# Patient Record
Sex: MALE | Race: WHITE | NOT HISPANIC OR LATINO | Employment: OTHER | URBAN - METROPOLITAN AREA
[De-identification: names, ages, dates, MRNs, and addresses within clinical notes are randomized per-mention and may not be internally consistent; named-entity substitution may affect disease eponyms.]

---

## 2023-08-09 RX ORDER — ROSUVASTATIN CALCIUM 20 MG/1
20 TABLET, COATED ORAL NIGHTLY
COMMUNITY
Start: 2023-04-21 | End: 2023-08-09 | Stop reason: SDUPTHER

## 2023-08-09 RX ORDER — LISINOPRIL 10 MG/1
10 TABLET ORAL
COMMUNITY
Start: 2023-04-21 | End: 2023-08-09 | Stop reason: SDUPTHER

## 2023-08-09 RX ORDER — METFORMIN HYDROCHLORIDE 500 MG/1
1000 TABLET ORAL 2 TIMES DAILY
COMMUNITY
Start: 2023-04-21 | End: 2023-08-09 | Stop reason: SDUPTHER

## 2023-08-09 RX ORDER — LEVOTHYROXINE SODIUM 137 UG/1
137 TABLET ORAL EVERY MORNING
COMMUNITY
Start: 2023-04-24 | End: 2023-08-09 | Stop reason: SDUPTHER

## 2023-08-09 NOTE — TELEPHONE ENCOUNTER
Pt states that he is soon to be out of all of his Rx.  Pt is scheduled with you in January and on wait list for earlier available appointment.

## 2023-08-10 RX ORDER — ROSUVASTATIN CALCIUM 20 MG/1
20 TABLET, COATED ORAL NIGHTLY
Qty: 90 TABLET | Refills: 1 | Status: SHIPPED | OUTPATIENT
Start: 2023-08-10 | End: 2024-02-20

## 2023-08-10 RX ORDER — LEVOTHYROXINE SODIUM 137 UG/1
137 TABLET ORAL EVERY MORNING
Qty: 90 TABLET | Refills: 1 | Status: SHIPPED | OUTPATIENT
Start: 2023-08-10 | End: 2024-02-20

## 2023-08-10 RX ORDER — LISINOPRIL 10 MG/1
10 TABLET ORAL DAILY
Qty: 90 TABLET | Refills: 1 | Status: SHIPPED | OUTPATIENT
Start: 2023-08-10 | End: 2024-02-20

## 2023-08-10 RX ORDER — METFORMIN HYDROCHLORIDE 500 MG/1
1000 TABLET ORAL 2 TIMES DAILY
Qty: 360 TABLET | Refills: 1 | Status: SHIPPED | OUTPATIENT
Start: 2023-08-10 | End: 2024-02-20

## 2023-12-12 ENCOUNTER — TELEPHONE (OUTPATIENT)
Dept: PRIMARY CARE CLINIC | Facility: CLINIC | Age: 73
End: 2023-12-12
Payer: MEDICARE

## 2023-12-12 ENCOUNTER — PATIENT MESSAGE (OUTPATIENT)
Dept: PRIMARY CARE CLINIC | Facility: CLINIC | Age: 73
End: 2023-12-12
Payer: MEDICARE

## 2023-12-12 DIAGNOSIS — E78.5 HYPERLIPIDEMIA, UNSPECIFIED HYPERLIPIDEMIA TYPE: ICD-10-CM

## 2023-12-12 DIAGNOSIS — E03.8 TSH (THYROID-STIMULATING HORMONE DEFICIENCY): ICD-10-CM

## 2023-12-12 DIAGNOSIS — I10 HYPERTENSION, UNSPECIFIED TYPE: Primary | ICD-10-CM

## 2023-12-12 DIAGNOSIS — R73.03 PRE-DIABETES: ICD-10-CM

## 2023-12-12 NOTE — TELEPHONE ENCOUNTER
----- Message from Chaya Farrell sent at 12/12/2023  8:01 AM CST -----  Name of Who is Calling:ROWAN RICARDO [[id number]]        What is the request in detail:Pt would like to request lab order to be sent over to [facility] prior to annual appt coming up, Pt states the request has been to [facility] before. Please advise thank you       Can the clinic reply by [facility]:NO        What Number to Call Back if not in [facility]:.Telephone Information:  Mobile          [phone]

## 2023-12-12 NOTE — TELEPHONE ENCOUNTER
----- Message from Chaya Farrell sent at 12/12/2023  8:01 AM CST -----  Name of Who is Calling:ROWAN RICARDO [[id number]]        What is the request in detail:Pt would like to request lab order to be sent over to [facility] prior to annual appt coming up, Pt states the request has been to [facility] before. Please advise thank you       Can the clinic reply by [facility]:NO        What Number to Call Back if not in [facility]:.Telephone Information:  Mobile          [phone]

## 2023-12-15 LAB
ALBUMIN SERPL-MCNC: 4.1 G/DL (ref 3.6–5.1)
ALBUMIN/GLOB SERPL: 1.5 (CALC) (ref 1–2.5)
ALP SERPL-CCNC: 64 U/L (ref 35–144)
ALT SERPL-CCNC: 11 U/L (ref 9–46)
AST SERPL-CCNC: 13 U/L (ref 10–35)
BASOPHILS # BLD AUTO: 51 CELLS/UL (ref 0–200)
BASOPHILS NFR BLD AUTO: 0.7 %
BILIRUB SERPL-MCNC: 0.4 MG/DL (ref 0.2–1.2)
BUN SERPL-MCNC: 18 MG/DL (ref 7–25)
BUN/CREAT SERPL: NORMAL (CALC) (ref 6–22)
CALCIUM SERPL-MCNC: 9.3 MG/DL (ref 8.6–10.3)
CHLORIDE SERPL-SCNC: 105 MMOL/L (ref 98–110)
CHOLEST SERPL-MCNC: 151 MG/DL
CHOLEST/HDLC SERPL: 2.8 (CALC)
CO2 SERPL-SCNC: 26 MMOL/L (ref 20–32)
CREAT SERPL-MCNC: 0.92 MG/DL (ref 0.7–1.28)
EGFR: 89 ML/MIN/1.73M2
EOSINOPHIL # BLD AUTO: 110 CELLS/UL (ref 15–500)
EOSINOPHIL NFR BLD AUTO: 1.5 %
ERYTHROCYTE [DISTWIDTH] IN BLOOD BY AUTOMATED COUNT: 12.4 % (ref 11–15)
GLOBULIN SER CALC-MCNC: 2.8 G/DL (CALC) (ref 1.9–3.7)
GLUCOSE SERPL-MCNC: 94 MG/DL (ref 65–99)
HBA1C MFR BLD: 5.7 % OF TOTAL HGB
HCT VFR BLD AUTO: 46.1 % (ref 38.5–50)
HDLC SERPL-MCNC: 54 MG/DL
HGB BLD-MCNC: 15.3 G/DL (ref 13.2–17.1)
LDLC SERPL CALC-MCNC: 77 MG/DL (CALC)
LYMPHOCYTES # BLD AUTO: 2862 CELLS/UL (ref 850–3900)
LYMPHOCYTES NFR BLD AUTO: 39.2 %
MCH RBC QN AUTO: 29.8 PG (ref 27–33)
MCHC RBC AUTO-ENTMCNC: 33.2 G/DL (ref 32–36)
MCV RBC AUTO: 89.9 FL (ref 80–100)
MONOCYTES # BLD AUTO: 767 CELLS/UL (ref 200–950)
MONOCYTES NFR BLD AUTO: 10.5 %
NEUTROPHILS # BLD AUTO: 3511 CELLS/UL (ref 1500–7800)
NEUTROPHILS NFR BLD AUTO: 48.1 %
NONHDLC SERPL-MCNC: 97 MG/DL (CALC)
PLATELET # BLD AUTO: 280 THOUSAND/UL (ref 140–400)
PMV BLD REES-ECKER: 10.4 FL (ref 7.5–12.5)
POTASSIUM SERPL-SCNC: 5.3 MMOL/L (ref 3.5–5.3)
PROT SERPL-MCNC: 6.9 G/DL (ref 6.1–8.1)
RBC # BLD AUTO: 5.13 MILLION/UL (ref 4.2–5.8)
SODIUM SERPL-SCNC: 141 MMOL/L (ref 135–146)
T3FREE SERPL-MCNC: 3.5 PG/ML (ref 2.3–4.2)
T4 FREE SERPL-MCNC: 1.7 NG/DL (ref 0.8–1.8)
TRIGL SERPL-MCNC: 114 MG/DL
TSH SERPL-ACNC: 1.49 MIU/L (ref 0.4–4.5)
WBC # BLD AUTO: 7.3 THOUSAND/UL (ref 3.8–10.8)

## 2024-01-02 ENCOUNTER — OFFICE VISIT (OUTPATIENT)
Dept: PRIMARY CARE CLINIC | Facility: CLINIC | Age: 74
End: 2024-01-02
Payer: MEDICARE

## 2024-01-02 VITALS
OXYGEN SATURATION: 97 % | RESPIRATION RATE: 18 BRPM | TEMPERATURE: 98 F | BODY MASS INDEX: 31.04 KG/M2 | WEIGHT: 204.81 LBS | HEART RATE: 62 BPM | HEIGHT: 68 IN | DIASTOLIC BLOOD PRESSURE: 62 MMHG | SYSTOLIC BLOOD PRESSURE: 118 MMHG

## 2024-01-02 DIAGNOSIS — I10 PRIMARY HYPERTENSION: ICD-10-CM

## 2024-01-02 DIAGNOSIS — R73.03 PRE-DIABETES: ICD-10-CM

## 2024-01-02 DIAGNOSIS — E89.0 POSTOPERATIVE HYPOTHYROIDISM: ICD-10-CM

## 2024-01-02 DIAGNOSIS — E78.5 HYPERLIPIDEMIA, UNSPECIFIED HYPERLIPIDEMIA TYPE: Primary | ICD-10-CM

## 2024-01-02 DIAGNOSIS — I10 HYPERTENSION, UNSPECIFIED TYPE: ICD-10-CM

## 2024-01-02 PROBLEM — E11.9 TYPE 2 DIABETES MELLITUS WITHOUT COMPLICATION, UNSPECIFIED WHETHER LONG TERM INSULIN USE: Status: ACTIVE | Noted: 2024-01-02

## 2024-01-02 PROBLEM — E11.9 TYPE 2 DIABETES MELLITUS WITHOUT COMPLICATION, UNSPECIFIED WHETHER LONG TERM INSULIN USE: Status: RESOLVED | Noted: 2024-01-02 | Resolved: 2024-01-02

## 2024-01-02 PROBLEM — E03.9 HYPOTHYROIDISM: Status: ACTIVE | Noted: 2024-01-02

## 2024-01-02 PROCEDURE — 3008F BODY MASS INDEX DOCD: CPT | Mod: CPTII,S$GLB,, | Performed by: FAMILY MEDICINE

## 2024-01-02 PROCEDURE — 99214 OFFICE O/P EST MOD 30 MIN: CPT | Mod: S$GLB,,, | Performed by: FAMILY MEDICINE

## 2024-01-02 PROCEDURE — 99999 PR PBB SHADOW E&M-EST. PATIENT-LVL III: CPT | Mod: PBBFAC,,, | Performed by: FAMILY MEDICINE

## 2024-01-02 PROCEDURE — 3074F SYST BP LT 130 MM HG: CPT | Mod: CPTII,S$GLB,, | Performed by: FAMILY MEDICINE

## 2024-01-02 PROCEDURE — 3078F DIAST BP <80 MM HG: CPT | Mod: CPTII,S$GLB,, | Performed by: FAMILY MEDICINE

## 2024-01-02 PROCEDURE — 1126F AMNT PAIN NOTED NONE PRSNT: CPT | Mod: CPTII,S$GLB,, | Performed by: FAMILY MEDICINE

## 2024-01-02 RX ORDER — AMOXICILLIN 500 MG
2 CAPSULE ORAL
COMMUNITY

## 2024-01-02 NOTE — PROGRESS NOTES
"    [facility]       [address] [place] [address]      [phone]     Marla Scott MD         .      Office Visit  01/02/2024  [id number]      SUBJECTIVE     HPI:  Delvis Russell is a 73 y.o. male presents today in clinic for "Follow-up  ."   Patient is here for routine follow-up.  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  Also has a history of cholesterol and hypertension on medications and well-controlled.  He is making plans to be more active this year and has been able to lose a few lb.  He has no complaints.  His sleep is good.  His blood pressure is been well-controlled on medications.  He has no under active thyroid symptoms.  He did have labs drawn, and would like to discuss these    Follow-up        ROS   Review of symptoms are negative except as noted.     PAST MEDICAL HISTORY     Past Medical History:   Diagnosis Date    Hyperlipidemia     Hypertension     Pre-diabetes     Thyroid disease        History reviewed. No pertinent surgical history.    Social History     Socioeconomic History    Marital status:    Tobacco Use    Smoking status: Former     Current packs/day: 0.50     Average packs/day: 0.5 packs/day for 5.0 years (2.5 ttl pk-yrs)     Types: Cigarettes     Passive exposure: Never    Smokeless tobacco: Never   Substance and Sexual Activity    Alcohol use: Yes    Drug use: Never    Sexual activity: Yes     Partners: Female       Allergies as of 01/02/2024    (No Known Allergies)       HOME MEDS     Current Outpatient Medications   Medication Instructions    ascorbic acid, vitamin C, (VITAMIN C) 100 MG tablet 1 tablet, Oral, Every morning    lisinopriL 10 mg, Oral, Daily    metFORMIN (GLUCOPHAGE) 1,000 mg, Oral, 2 times daily    omega-3 fatty acids/fish oil (FISH OIL-OMEGA-3 FATTY ACIDS) 300-1,000 mg capsule 2 g, Oral    rosuvastatin (CRESTOR) 20 mg, Oral, Nightly    " "tumeric-ging-olive-oreg-capryl 100 mg-150 mg- 50 mg-150 mg Cap Oral    UNITHROID 137 mcg, Oral, Every morning       The following were updated and reviewed by myself in the chart: medications, past medical history, past surgical history, family history, social history, and allergies.        Objective    OBJECTIVE   /62   Pulse 62   Temp 98.3 °F (36.8 °C) (Oral)   Resp 18   Ht 5' 8" (1.727 m)   Wt 92.9 kg (204 lb 12.9 oz)   SpO2 97%   BMI 31.14 kg/m²     Wt Readings from Last 2 Encounters:   01/02/24 92.9 kg (204 lb 12.9 oz)       BP Readings from Last 2 Encounters:   01/02/24 118/62          Physical Exam  Vitals and nursing note reviewed.   Constitutional:       Appearance: Normal appearance. He is obese.   HENT:      Head: Normocephalic and atraumatic.      Nose: Nose normal.      Mouth/Throat:      Mouth: Mucous membranes are dry.   Eyes:      General: Lids are normal.      Conjunctiva/sclera: Conjunctivae normal.      Pupils: Pupils are equal, round, and reactive to light.   Neck:      Thyroid: No thyromegaly or thyroid tenderness.   Cardiovascular:      Rate and Rhythm: Normal rate and regular rhythm.      Pulses: Normal pulses.   Pulmonary:      Effort: Pulmonary effort is normal.      Breath sounds: Normal breath sounds.   Abdominal:      General: Abdomen is flat.      Palpations: Abdomen is soft.      Tenderness: There is no abdominal tenderness.   Musculoskeletal:         General: Normal range of motion.      Cervical back: Normal range of motion and neck supple. No muscular tenderness.   Skin:     General: Skin is warm and dry.      Findings: No lesion or rash.   Neurological:      General: No focal deficit present.      Mental Status: He is alert and oriented to person, place, and time.      Cranial Nerves: Cranial nerves 2-12 are intact.      Sensory: Sensation is intact.      Motor: Motor function is intact.      Coordination: Coordination is intact.      Gait: Gait is intact. "   Psychiatric:         Attention and Perception: Attention normal. He is attentive.         Mood and Affect: Mood normal. Mood is not anxious or depressed. Affect is not tearful.         Speech: Speech is not rapid and pressured.         Behavior: Behavior normal. Behavior is not slowed or hyperactive.               LABS      LAB RESULTS, IF AVAILABLE, ARE DISCUSSED WITH PATIENT AND POSTED TO PATIENT PORTAL     ASSESSMENT & PLAN     1. Hyperlipidemia, unspecified hyperlipidemia type  -     CBC Without Differential; Future; Expected date: 06/02/2024  -     Comprehensive Metabolic Panel; Future; Expected date: 06/02/2024  -     Lipid Panel; Future; Expected date: 06/02/2024  -     TSH; Future; Expected date: 06/02/2024  -     T4, Free; Future; Expected date: 06/02/2024  -     T3, Free; Future; Expected date: 06/02/2024  -     Hemoglobin A1C; Future; Expected date: 06/02/2024    2. Hypertension, unspecified type  -     CBC Without Differential; Future; Expected date: 06/02/2024  -     Comprehensive Metabolic Panel; Future; Expected date: 06/02/2024  -     Lipid Panel; Future; Expected date: 06/02/2024  -     TSH; Future; Expected date: 06/02/2024  -     T4, Free; Future; Expected date: 06/02/2024  -     T3, Free; Future; Expected date: 06/02/2024  -     Hemoglobin A1C; Future; Expected date: 06/02/2024    3. Pre-diabetes  -     CBC Without Differential; Future; Expected date: 06/02/2024  -     Comprehensive Metabolic Panel; Future; Expected date: 06/02/2024  -     Lipid Panel; Future; Expected date: 06/02/2024  -     TSH; Future; Expected date: 06/02/2024  -     T4, Free; Future; Expected date: 06/02/2024  -     T3, Free; Future; Expected date: 06/02/2024  -     Hemoglobin A1C; Future; Expected date: 06/02/2024    4. Postoperative hypothyroidism  -     CBC Without Differential; Future; Expected date: 06/02/2024  -     Comprehensive Metabolic Panel; Future; Expected date: 06/02/2024  -     Lipid Panel; Future; Expected  "date: 06/02/2024  -     TSH; Future; Expected date: 06/02/2024  -     T4, Free; Future; Expected date: 06/02/2024  -     T3, Free; Future; Expected date: 06/02/2024  -     Hemoglobin A1C; Future; Expected date: 06/02/2024    5. Primary hypertension        Patient is doing well.  His labs are controlled.  Doing well on statin for cholesterol.  Blood pressure well-controlled on ACE inhibitor.  Thyroid is optimal with Unithroid.  We will continue his current regimen, and we will plan to follow-up in six months for routine labs.    I spent a total of 30 minutes face to face and non-face to face on the date of this visit.This includes time preparing to see the patient (eg, review of tests, notes), obtaining and/or reviewing additional history from an independent historian and/or outside medical records, documenting clinical information in the electronic health record, independently interpreting results and/or communicating results to the patient/family/caregiver, or care coordinator.      Disclaimer: Portions of this record may have been created with voice recognition software. Occasional wrong-word or "sound-a-like" substitutions may have occurred due to inherent limitations of voice recognition software. Read the chart carefully and recognize, using context, where substitutions have occurred."    Signed by:  Marla Scott MD           "

## 2024-01-03 ENCOUNTER — TELEPHONE (OUTPATIENT)
Dept: PRIMARY CARE CLINIC | Facility: CLINIC | Age: 74
End: 2024-01-03
Payer: MEDICARE

## 2024-01-03 NOTE — TELEPHONE ENCOUNTER
Called pt and lvm stating  schedule is not open yet for next year, that if he would like to wait til he opens we can do that or I can get him scheduled for the end of December

## 2024-02-20 RX ORDER — METFORMIN HYDROCHLORIDE 500 MG/1
1000 TABLET ORAL 2 TIMES DAILY
Qty: 360 TABLET | Refills: 2 | Status: SHIPPED | OUTPATIENT
Start: 2024-02-20

## 2024-02-20 RX ORDER — LISINOPRIL 10 MG/1
10 TABLET ORAL
Qty: 90 TABLET | Refills: 2 | Status: SHIPPED | OUTPATIENT
Start: 2024-02-20

## 2024-02-20 RX ORDER — ROSUVASTATIN CALCIUM 20 MG/1
20 TABLET, COATED ORAL NIGHTLY
Qty: 90 TABLET | Refills: 2 | Status: SHIPPED | OUTPATIENT
Start: 2024-02-20

## 2024-02-20 RX ORDER — LEVOTHYROXINE SODIUM 137 UG/1
137 TABLET ORAL EVERY MORNING
Qty: 90 TABLET | Refills: 2 | Status: SHIPPED | OUTPATIENT
Start: 2024-02-20